# Patient Record
Sex: FEMALE | Race: WHITE | NOT HISPANIC OR LATINO | Employment: FULL TIME | URBAN - METROPOLITAN AREA
[De-identification: names, ages, dates, MRNs, and addresses within clinical notes are randomized per-mention and may not be internally consistent; named-entity substitution may affect disease eponyms.]

---

## 2018-03-15 ENCOUNTER — HOSPITAL ENCOUNTER (OUTPATIENT)
Dept: RADIOLOGY | Facility: MEDICAL CENTER | Age: 58
End: 2018-03-15
Attending: OTOLARYNGOLOGY
Payer: COMMERCIAL

## 2018-03-15 DIAGNOSIS — R22.1 NECK MASS: ICD-10-CM

## 2018-03-15 PROCEDURE — 76536 US EXAM OF HEAD AND NECK: CPT

## 2018-07-25 ENCOUNTER — OFFICE VISIT (OUTPATIENT)
Dept: PULMONOLOGY | Facility: HOSPICE | Age: 58
End: 2018-07-25
Payer: COMMERCIAL

## 2018-07-25 VITALS
WEIGHT: 184 LBS | BODY MASS INDEX: 29.57 KG/M2 | HEART RATE: 77 BPM | TEMPERATURE: 97.8 F | HEIGHT: 66 IN | OXYGEN SATURATION: 98 % | RESPIRATION RATE: 16 BRPM | DIASTOLIC BLOOD PRESSURE: 74 MMHG | SYSTOLIC BLOOD PRESSURE: 122 MMHG

## 2018-07-25 DIAGNOSIS — G47.33 OSA (OBSTRUCTIVE SLEEP APNEA): ICD-10-CM

## 2018-07-25 PROCEDURE — 99244 OFF/OP CNSLTJ NEW/EST MOD 40: CPT | Performed by: INTERNAL MEDICINE

## 2018-07-25 RX ORDER — LORATADINE 10 MG/1
CAPSULE, LIQUID FILLED ORAL
COMMUNITY

## 2018-07-25 NOTE — LETTER
Alireza Jacobson M.D.  [facility] Pulmonary Medicine   [address] [place], [place] [address], [place] [address]  Phone: [phone] - Fax: [phone]           Encounter Date: 7/25/2018  Provider: Alireza Jacobson M.D.  Location of Care: [facility] PULMONARY MEDICINE      Patient:   Pati Kenney   MR Number: [id number]   YOB: 1960     PROGRESS NOTE:  Chief Complaint   Patient presents with   • New Patient     Hypoxemia       HPI:  The patient is a 58-year-old woman who was told by her fiancé that she has significant snoring.  That resulted in overnight oximetry which demonstrated cyclic hypoxemia.  Patient spent 108 minutes with saturations less than 88%.  Her low saturation was 83%.  In addition to snoring the patient has non-refreshing sleep and has had episodes of daytime hypersomnolence.  She can fall asleep easily.  She also has a morning headache.  There is no definite history of witnessed apneas.  The patient has no history of lung disease.  No history of COPD or asthma.  She is not on any supplemental oxygen.    Past Medical History:   Diagnosis Date   • Back pain    • Chickenpox    • Daytime sleepiness    • Depression    • Fatigue    • Snoring    • Swelling of lower extremity    • Venereal disease        ROS:  Constitutional: Denies fevers, chills, night sweats, fatigue or weight loss  Eyes: Denies vision loss, pain, drainage, double vision  Ears, Nose, Throat: Denies earache, tinnitus, hoarseness  Cardiovascular: Denies chest pain, tightness, palpitations  Respiratory: Denies shortness of breath, sputum production, cough, hemoptysis  Sleep: See HPI  GI: Denies abdominal pain, nausea, vomiting, diarrhea  : Denies frequent urination, hematuria, painful urination  Musculoskeletal: Denies back pain, painful joints, sore muscles  Neurological: Denies headaches, seizures  Skin: Denies rashes, color changes  Psychiatric: Denies depression or thoughts of suicide  Hematologic:  "Denies bleeding tendency or clotting tendency  Allergic/Immunologic: Denies rhinitis, skin sensitivity    Social History     Social History   • Marital status: Single     Spouse name: N/A   • Number of children: N/A   • Years of education: N/A     Occupational History   • Not on file.     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       • Zolpidem Tartrate (AMBIEN PO) Take  by mouth.       • conjugated estrogen (PREMARIN) 0.625 MG TABS Take 0.625 mg by mouth every day. DAYS 1-25        No current facility-administered medications on file prior to visit.      Blood pressure 122/74, pulse 77, temperature 36.6 °C (97.8 °F), resp. rate 16, height 1.676 m (5' 6\"), weight 83.5 kg (184 lb), SpO2 98 %.  Family History   Problem Relation Age of Onset   • Heart Disease Father        Physical Exam:    HEENT: PERRLA, EOMI, no scleral icterus, no nasal or oral lesions  Neck: No thyromegaly, no adenopathy, no bruits  Mallampatti: Grade III  Lungs: Equal breath sounds, no wheezes or crackles  Heart: Regular rate and rhythm, no gallops or murmurs  Abdomen: Soft, benign, no organomegaly  Extremities: No clubbing, cyanosis, or edema  Neurologic: Cranial nerve, motor, and sensory exam are normal    1. ARCHANA (obstructive sleep apnea)        The patient has a constellation of signs and symptoms consistent with the diagnosis of obstructive sleep apnea with poor sleep quality, snoring, and excessive daytime somnolence.    The patient is a candidate for polysomnography.  Home study will be arranged.    We will see the " patient afterwards to review results and suggest any possible therapy.      Electronically signed by Alireza Jacobson M.D.  on 07/25/18    No Recipients

## 2018-07-25 NOTE — PROGRESS NOTES
Chief Complaint   Patient presents with   • New Patient     Hypoxemia       HPI:  The patient is a 58-year-old woman who was told by her fiancé that she has significant snoring.  That resulted in overnight oximetry which demonstrated cyclic hypoxemia.  Patient spent 108 minutes with saturations less than 88%.  Her low saturation was 83%.  In addition to snoring the patient has non-refreshing sleep and has had episodes of daytime hypersomnolence.  She can fall asleep easily.  She also has a morning headache.  There is no definite history of witnessed apneas.  The patient has no history of lung disease.  No history of COPD or asthma.  She is not on any supplemental oxygen.    Past Medical History:   Diagnosis Date   • Back pain    • Chickenpox    • Daytime sleepiness    • Depression    • Fatigue    • Snoring    • Swelling of lower extremity    • Venereal disease        ROS:  Constitutional: Denies fevers, chills, night sweats, fatigue or weight loss  Eyes: Denies vision loss, pain, drainage, double vision  Ears, Nose, Throat: Denies earache, tinnitus, hoarseness  Cardiovascular: Denies chest pain, tightness, palpitations  Respiratory: Denies shortness of breath, sputum production, cough, hemoptysis  Sleep: See HPI  GI: Denies abdominal pain, nausea, vomiting, diarrhea  : Denies frequent urination, hematuria, painful urination  Musculoskeletal: Denies back pain, painful joints, sore muscles  Neurological: Denies headaches, seizures  Skin: Denies rashes, color changes  Psychiatric: Denies depression or thoughts of suicide  Hematologic: Denies bleeding tendency or clotting tendency  Allergic/Immunologic: Denies rhinitis, skin sensitivity    Social History     Social History   • Marital status: Single     Spouse name: N/A   • Number of children: N/A   • Years of education: N/A     Occupational History   • Not on file.     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       • Zolpidem Tartrate (AMBIEN PO) Take  by mouth.       • conjugated estrogen (PREMARIN) 0.625 MG TABS Take 0.625 mg by mouth every day. DAYS 1-25        No current facility-administered medications on file prior to visit.      Blood pressure 122/74, pulse 77, temperature 36.6 °C (97.8 °F), resp. rate 16, height 1.676 m (5' 6\"), weight 83.5 kg (184 lb), SpO2 98 %.  Family History   Problem Relation Age of Onset   • Heart Disease Father        Physical Exam:    HEENT: PERRLA, EOMI, no scleral icterus, no nasal or oral lesions  Neck: No thyromegaly, no adenopathy, no bruits  Mallampatti: Grade III  Lungs: Equal breath sounds, no wheezes or crackles  Heart: Regular rate and rhythm, no gallops or murmurs  Abdomen: Soft, benign, no organomegaly  Extremities: No clubbing, cyanosis, or edema  Neurologic: Cranial nerve, motor, and sensory exam are normal    1. ARCHANA (obstructive sleep apnea)        The patient has a constellation of signs and symptoms consistent with the diagnosis of obstructive sleep apnea with poor sleep quality, snoring, and excessive daytime somnolence.    The patient is a candidate for polysomnography.  Home study will be arranged.    We will see the patient afterwards to review results and suggest any possible therapy.  "

## 2018-11-07 ENCOUNTER — TELEPHONE (OUTPATIENT)
Dept: PULMONOLOGY | Facility: HOSPICE | Age: 58
End: 2018-11-07

## 2018-11-07 NOTE — TELEPHONE ENCOUNTER
Pt scheduled to be seen with Hank GOVEA APRN 11/9/18 per Dr. Jacobson 7/25/18  appt HST ordered. Left VM encouraged the patient to call to schedule HST prior to ov 11/9/18 or can rescheduled or be seen without testing. Encouraged pt to call [phone]

## 2018-11-09 ENCOUNTER — APPOINTMENT (OUTPATIENT)
Dept: SLEEP MEDICINE | Facility: MEDICAL CENTER | Age: 58
End: 2018-11-09
Payer: COMMERCIAL